# Patient Record
Sex: FEMALE | Race: BLACK OR AFRICAN AMERICAN | Employment: UNEMPLOYED | URBAN - METROPOLITAN AREA
[De-identification: names, ages, dates, MRNs, and addresses within clinical notes are randomized per-mention and may not be internally consistent; named-entity substitution may affect disease eponyms.]

---

## 2021-12-28 ENCOUNTER — HOSPITAL ENCOUNTER (OUTPATIENT)
Dept: PHYSICAL THERAPY | Age: 66
Discharge: HOME | End: 2021-12-28
Attending: ORTHOPAEDIC SURGERY
Payer: OTHER GOVERNMENT

## 2021-12-28 DIAGNOSIS — M94.261 CHONDROMALACIA, KNEE, RIGHT: ICD-10-CM

## 2021-12-28 DIAGNOSIS — G89.29 CHRONIC RIGHT-SIDED LOW BACK PAIN, UNSPECIFIED WHETHER SCIATICA PRESENT: ICD-10-CM

## 2021-12-28 DIAGNOSIS — M25.561 ACUTE PAIN OF RIGHT KNEE: ICD-10-CM

## 2021-12-28 DIAGNOSIS — M54.50 CHRONIC RIGHT-SIDED LOW BACK PAIN, UNSPECIFIED WHETHER SCIATICA PRESENT: ICD-10-CM

## 2021-12-28 DIAGNOSIS — M21.70 ACQUIRED LEG LENGTH DISCREPANCY: ICD-10-CM

## 2021-12-28 PROCEDURE — 97110 THERAPEUTIC EXERCISES: CPT

## 2021-12-28 PROCEDURE — 97162 PT EVAL MOD COMPLEX 30 MIN: CPT

## 2021-12-28 NOTE — PROGRESS NOTES
Raquel Villegas  : 1955  Primary: [facility]  Secondary:  [address] at [place] [id number] [facility], [address], [place] [address].  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 TREATMENT PLAN:  Effective Dates: 2021 TO 3/22/2022 (90 days). Frequency/Duration: 2 times a week for 90 Day(s)    Pre-treatment Symptoms/Complaints:  LBP, R Knee Pain  Pain: Initial:   9-10/10 Post Session:  9-10/10   Medications Last Reviewed:  2021  Updated Objective Findings:  See evaluation note from today  TREATMENT:     THERAPEUTIC EXERCISE: (10 minutes):  Exercises per grid below to improve mobility and strength. Required minimal verbal cues to promote proper body alignment and promote proper body posture. Progressed resistance and repetitions as indicated. Date:  21 Date:   Date:     Activity/Exercise Parameters Parameters Parameters   Quad Sets 15 reps  5 sec holds  (HEP)     Heel Slides 15 reps  5 sec holds  (HEP)     Supine Lumbar Rotation Stretch 10 reps  5 sec holds  Bilaterally  (HEP)     SKTC 3 reps  20 sec holds  (HEP)                           MedBridge Portal  Treatment/Session Summary:    · Response to Treatment:  Pt tolerated treatments well today with no c/o. · Communication/Consultation:  None today  · Equipment provided today:  None today  · Recommendations/Intent for next treatment session: Next visit will focus on manual therapy, progress therex as tolerable.     Total Treatment Billable Duration:  10 minutes  PT Patient Time In/Time Out  Time In: 0845  Time Out: Louis Novak, PT   Visit # Therapist initials Date Arrived NS/ Cx < 24 hr >24 hr Cx Visit Comments   1  12-28-21 X   15 Visit Max                                                                                                                                                              Abbreviations: NS = No Show; CX = cancelled      Future Appointments   Date Time Provider Radha Zeenat   1/3/2022  3:15 PM Lylia Nanas, PTA [facility]   1/5/2022  3:15 PM Jason Cantrell, PTA [facility]   1/10/2022  3:15 PM Mustafa Litter, PT [facility]   1/12/2022  9:30 AM Mustafa Litter, PT [facility]   1/17/2022  1:45 PM Lylia Nanas, PTA [facility]   1/19/2022  1:00 PM Lylia Nanas, PTA [facility]   1/20/2022  9:30 AM Norm Hand MD [facility]   1/24/2022  8:45 AM Lylia Nanas, PTA [facility]   1/27/2022  8:45 AM Mustafa Litter, PT [facility]   1/31/2022  8:45 AM Jason Eugene, PTA [facility]   2/10/2022  9:00 AM Norm Hand MD [facility]
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
## 2022-01-05 ENCOUNTER — HOSPITAL ENCOUNTER (OUTPATIENT)
Dept: PHYSICAL THERAPY | Age: 67
Discharge: HOME | End: 2022-01-05
Attending: ORTHOPAEDIC SURGERY
Payer: OTHER GOVERNMENT

## 2022-01-05 PROCEDURE — 97110 THERAPEUTIC EXERCISES: CPT

## 2022-01-05 NOTE — PROGRESS NOTES
Jake Jobs  : 1955  Primary: [facility]  Secondary:  [address] at [facility]  [address] [facility], [address], [place] [address].  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 TREATMENT PLAN:  Effective Dates: 2021 TO 3/22/2022 (90 days). Frequency/Duration: 2 times a week for 90 Day(s)    Pre-treatment Symptoms/Complaints:  LBP, R Knee Pain  \"No new change in symptoms at this time. Pain: Initial:   5-/10 Post Session:  510   Medications Last Reviewed:  2022  Updated Objective Findings:  None Today  TREATMENT:     THERAPEUTIC EXERCISE: (40 minutes):  Exercises per grid below to improve mobility and strength. Required minimal verbal cues to promote proper body alignment and promote proper body posture. Progressed resistance and repetitions as indicated. Date:  22 Date:   Date:     Activity/Exercise Parameters Parameters Parameters   Quad Sets 15 reps  5 sec holds  (HEP)     Heel Slides 15 reps  5 sec holds  (HEP)     Supine Lumbar Rotation Stretch 10 reps  5 sec holds  Bilaterally  (HEP)     SKTC 3 reps  20 sec holds  (HEP)     Nu-Step X 6 minutes     Piriformis stretch 3 x 20 sec holds bilaterally     LAQ-SAQ 1# x 20 reps. Hip Abduction T-band Green x 20 reps     Pelvic tilt X 15 reps     Standing LE exercises x     Bridging X 10 reps         MedBridge Portal  Treatment/Session Summary:    · Response to Treatment:  Pt tolerated treatments well today with no c/o.   Added a few more stretches for HEP  · Communication/Consultation:  None today  · Equipment provided today:  None today  · Recommendations/Intent for next treatment session: Next visit will focus on manual therapy, progress therex as tolerable.     Total Treatment Billable Duration:  40 minutes  PT Patient Time In/Time Out  Time In: 2329  Time Out: [address], [facility]   Visit # Therapist initials Date Arrived NS/ Cx < 24 hr >24 hr Cx Visit Comments   1 BW 12-28-21 X   15 Visit Max   2 rjk 1-5-22 x                                                                                                                                                        Abbreviations: NS = No Show; CX = cancelled      Future Appointments   Date Time Provider Radha Epperson   1/10/2022  3:15 PM Luis Bending, PT [facility]   1/12/2022  9:30 AM Luis Bending, PT [facility]   1/17/2022  1:45 PM Aptria Moll, PTA [facility]   1/19/2022  1:00 PM Patria Moll, PTA [facility]   1/20/2022  9:30 AM Joan Banuelos MD [facility]   1/24/2022  8:45 AM Patria Moll, PTA [facility]   1/27/2022  8:45 AM Luis Bending, PT [facility]   1/31/2022  8:45 AM Emely Tidwell, PTA [facility]   2/10/2022  9:00 AM Joan Banuelos MD [facility]

## 2022-01-10 ENCOUNTER — HOSPITAL ENCOUNTER (OUTPATIENT)
Dept: PHYSICAL THERAPY | Age: 67
Discharge: HOME | End: 2022-01-10
Attending: ORTHOPAEDIC SURGERY
Payer: OTHER GOVERNMENT

## 2022-01-10 PROCEDURE — 97110 THERAPEUTIC EXERCISES: CPT

## 2022-01-10 NOTE — PROGRESS NOTES
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 TREATMENT PLAN:  Effective Dates: 2021 TO 3/22/2022 (90 days). Frequency/Duration: 2 times a week for 90 Day(s)    Pre-treatment Symptoms/Complaints:  LBP, R Knee Pain;  Pt states she is having pain in her low back and R knee today. Pain: Initial:   5/10 Post Session:  510   Medications Last Reviewed:  1/10/2022  Updated Objective Findings:  None Today  TREATMENT:     THERAPEUTIC EXERCISE: (40 minutes):  Exercises per grid below to improve mobility and strength. Required minimal verbal cues to promote proper body alignment and promote proper body posture. Progressed resistance and repetitions as indicated. Date:  22 Date:  01-10-22 Date:     Activity/Exercise Parameters Parameters Parameters   Quad Sets 15 reps  5 sec holds  (HEP) 20 reps  5 sec holds    Heel Slides 15 reps  5 sec holds  (HEP) 20 reps  5 sec holds    Supine Lumbar Rotation Stretch 10 reps  5 sec holds  Bilaterally  (HEP) 10 reps  5 sec holds  Bilaterally    SKTC 3 reps  20 sec holds  (HEP) 3 reps  20 sec holds    Nu-Step X 6 minutes Level 3  6 minutes    Piriformis stretch 3 x 20 sec holds bilaterally 3 reps  20 sec holds    LAQ-SAQ 1# x 20 reps.  20 reps each    Hip Abduction T-band Green x 20 reps Green T-band  20 reps    Pelvic tilt X 15 reps 15 reps  5 sec holds    Standing LE exercises x     Bridging X 10 reps 10 reps  5 sec holds        MedBridge Portal  Treatment/Session Summary:    · Response to Treatment:  Pt tolerated treatments well today with no c/o increased pain. · Communication/Consultation:  None today  · Equipment provided today:  None today  · Recommendations/Intent for next treatment session: Next visit will focus on manual therapy, progress therex as tolerable.     Total Treatment Billable Duration:  40 minutes  PT Patient Time In/Time Out  Time In: 0531  Time Out: [address] Griffin Buerger, PT   Visit # Therapist initials Date Arrived NS/ Cx < 24 hr >24 hr Cx Visit Comments   1  12-28-21 X   15 Visit Max   2 rjk 1-5-22 x      3  01-10-22 X                                                                                                                                               Abbreviations: NS = No Show; CX = cancelled      Future Appointments   Date Time Provider Radha Epperson   1/12/2022  9:30 AM Didier Mayers, PT [facility]   1/17/2022  1:45 PM Kait Negron, PTA [facility]   1/19/2022  1:00 PM Kait Negron, PTA [facility]   1/20/2022  9:30 AM Yumiko Rodriguez MD [facility]   1/24/2022  2:30 PM Kait Negron PTA [facility]   1/27/2022  4:00 PM Didier Mayers, PT [facility]   1/31/2022  2:30 PM [facility], Nataliya Evans, PTA [facility]   2/10/2022  9:00 AM Yumiko Rodriguez MD [facility]

## 2022-01-12 ENCOUNTER — HOSPITAL ENCOUNTER (OUTPATIENT)
Dept: PHYSICAL THERAPY | Age: 67
Discharge: HOME | End: 2022-01-12
Attending: ORTHOPAEDIC SURGERY
Payer: OTHER GOVERNMENT

## 2022-01-12 PROCEDURE — 97110 THERAPEUTIC EXERCISES: CPT

## 2022-01-12 NOTE — PROGRESS NOTES
Sylvester Garcia  : 1955  Primary: [facility]  Secondary:  Therapy Center at [place] [address] [facility], [address] [place] [address] [address], [place] [address].  [phone]   [phone]         OUTPATIENT PHYSICAL THERAPY: Daily Treatment Note 2022  Visit Count:  4    ICD-10: Treatment Diagnosis: Pain in right knee (M25.561); Chondromalacia, right knee (M94.261); Low back pain (M54.5)  Precautions/Allergies:   Patient has no known allergies. TREATMENT PLAN:  Effective Dates: 2021 TO 3/22/2022 (90 days). Frequency/Duration: 2 times a week for 90 Day(s)    Pre-treatment Symptoms/Complaints:  LBP, R Knee Pain;  Pt states she is wearing an abdominal binder today which makes her back feel good. Pain: Initial:   5/10 Post Session:  510   Medications Last Reviewed:  2022  Updated Objective Findings:  None Today  TREATMENT:     THERAPEUTIC EXERCISE: (30 minutes):  Exercises per grid below to improve mobility and strength. Required minimal verbal cues to promote proper body alignment and promote proper body posture. Progressed resistance and repetitions as indicated. Date:  22 Date:  01-10-22 Date:  22   Activity/Exercise Parameters Parameters Parameters   Quad Sets 15 reps  5 sec holds  (HEP) 20 reps  5 sec holds    Heel Slides 15 reps  5 sec holds  (HEP) 20 reps  5 sec holds    Supine Lumbar Rotation Stretch 10 reps  5 sec holds  Bilaterally  (HEP) 10 reps  5 sec holds  Bilaterally 10 reps  5 sec holds  Bilaterally   SKTC 3 reps  20 sec holds  (HEP) 3 reps  20 sec holds 3 reps  20 sec holds   Nu-Step X 6 minutes Level 3  6 minutes Level 4  6 minutes   Piriformis stretch 3 x 20 sec holds bilaterally 3 reps  20 sec holds 3 reps  20 sec holds   LAQ-SAQ 1# x 20 reps.  20 reps each    Hip Abduction T-band Green x 20 reps Green T-band  20 reps    Pelvic tilt X 15 reps 15 reps  5 sec holds 20 reps  5 sec holds   Standing LE exercises x     Bridging X 10 reps 10 reps  5 sec holds 10 reps  5 sec holds   Active Hamstring Stretch   3 reps  20 sec holds   SLR Flexion   10 reps  3 sec holds  B LEONARD's       MedBridge Portal  Treatment/Session Summary:    · Response to Treatment:  Pt tolerated treatments well today with no c/o increased pain. Pt was 10 minutes late for her appointment today. She did well with new exercises. · Communication/Consultation:  None today  · Equipment provided today:  None today  · Recommendations/Intent for next treatment session: Next visit will focus on manual therapy, progress therex as tolerable.     Total Treatment Billable Duration:  40 minutes  PT Patient Time In/Time Out  Time In: 9392  Time Out: [address] [facility], PT   Visit # Therapist initials Date Arrived NS/ Cx < 24 hr >24 hr Cx Visit Comments   1  12-28-21 X   15 Visit Max   2 rjk 1-5-22 x      3  01-10-22 X      4  01-12-22 X                                                                                                                                      Abbreviations: NS = No Show; CX = cancelled      Future Appointments   Date Time Provider Radha Epperson   1/17/2022  1:45 PM Kait Negron PTA [facility]   1/19/2022  1:00 PM Kait Negron, PTA [facility]   1/20/2022  9:30 AM Yumiko Rodriguez MD [facility]   1/24/2022  2:30 PM Kait Negron PTA [facility]   1/27/2022  4:00 PM Didier Mayers, PT [facility]   1/31/2022  2:30 PM Nataliya Niño, PTA [facility]   2/10/2022  9:00 AM Yumiko Rodriguez MD [facility]

## 2022-01-17 ENCOUNTER — APPOINTMENT (OUTPATIENT)
Dept: PHYSICAL THERAPY | Age: 67
End: 2022-01-17
Attending: ORTHOPAEDIC SURGERY
Payer: OTHER GOVERNMENT

## 2022-01-24 ENCOUNTER — HOSPITAL ENCOUNTER (OUTPATIENT)
Dept: PHYSICAL THERAPY | Age: 67
Discharge: HOME | End: 2022-01-24
Attending: ORTHOPAEDIC SURGERY
Payer: OTHER GOVERNMENT

## 2022-01-24 PROCEDURE — 97110 THERAPEUTIC EXERCISES: CPT

## 2022-01-24 NOTE — PROGRESS NOTES
Efrem Ainsworth  : 1955  Primary: [facility]  [facility]:  [facility], [address], [facility] [address].  [phone]   [phone]         OUTPATIENT PHYSICAL THERAPY: Daily Treatment Note 2022  Visit Count:  5    ICD-10: Treatment Diagnosis: Pain in right knee (M25.561); Chondromalacia, right knee (M94.261); Low back pain (M54.5)  Precautions/Allergies:   Patient has no known allergies. TREATMENT PLAN:  Effective Dates: 2021 TO 3/22/2022 (90 days). Frequency/Duration: 2 times a week for 90 Day(s)    Pre-treatment Symptoms/Complaints:  LBP, R Knee Pain;  \"They upped my pills so I feel a little better\"  Pain: Initial:   5/10 Post Session:  510   Medications Last Reviewed:  2022  Updated Objective Findings:  None Today  TREATMENT:     THERAPEUTIC EXERCISE: (30 minutes):  Exercises per grid below to improve mobility and strength. Required minimal verbal cues to promote proper body alignment and promote proper body posture. Progressed resistance and repetitions as indicated. Date:  1-10-22 Date:  22 Date:  22   Activity/Exercise Parameters Parameters Parameters   Quad Sets 15 reps  5 sec holds  (HEP) 20 reps  5 sec holds    Heel Slides 15 reps  5 sec holds  (HEP) 20 reps  5 sec holds    Supine Lumbar Rotation Stretch 10 reps  5 sec holds  Bilaterally  (HEP) 10 reps  5 sec holds  Bilaterally 10 reps  5 sec holds  Bilaterally   SKTC 3 reps  20 sec holds  (HEP) 3 reps  20 sec holds 3 reps  20 sec holds   Nu-Step X 6 minutes Level 3  6 minutes Level 4  6 minutes   Piriformis stretch 3 x 20 sec holds bilaterally 3 reps  20 sec holds 3 reps  20 sec holds   LAQ-SAQ 1# x 20 reps.  20 reps each    Hip Abduction T-band Green x 20 reps Green T-band  20 reps Blue  20 reps   Pelvic tilt X 15 reps 15 reps  5 sec holds 20 reps  5 sec holds   Standing LE exercises x  x   Bridging X 10 reps 10 reps  5 sec holds 10 reps  5 sec holds   Active Hamstring Stretch   3 reps  20 sec holds   SLR Flexion   15 reps  3 sec holds  B LE's       [employer] Portal  Treatment/Session Summary:    · Response to Treatment:  Pt tolerated treatments well today with no c/o increased pain. · Communication/Consultation:  None today  · Equipment provided today:  None today  · Recommendations/Intent for next treatment session: Next visit will focus on manual therapy, progress therex as tolerable.     Total Treatment Billable Duration:  40 minutes  PT Patient Time In/Time Out  Time In: 1430  Time Out: Ansley MONTGOMERY 330, PTA   Visit # Therapist initials Date Arrived NS/ Cx < 24 hr >24 hr Cx Visit Comments   1  12-28-21 X   15 Visit Max   2 rjk 1-5-22 x      3  01-10-22 X      4  01-12-22 X      5 rjk 1-22-22 x      6 rjk 1-24-22                                                                                                                     Abbreviations: NS = No Show; CX = cancelled      Future Appointments   Date Time Provider Radha Epperson   1/24/2022  2:30 PM Ronda Jimenez PTA [facility]   1/27/2022  4:00 PM Bernardo Coburn PT [facility]   1/31/2022  2:30 PM [facility]   2/22/2022  1:45 PM Alberto Cooney MD [facility] [place]

## 2022-01-27 ENCOUNTER — HOSPITAL ENCOUNTER (OUTPATIENT)
Dept: PHYSICAL THERAPY | Age: 67
Discharge: HOME | End: 2022-01-27
Attending: ORTHOPAEDIC SURGERY
Payer: OTHER GOVERNMENT

## 2022-01-27 PROCEDURE — 97110 THERAPEUTIC EXERCISES: CPT

## 2022-01-27 NOTE — PROGRESS NOTES
Jake Jobs  : 1955  Primary: [facility]  Secondary:  [facility]rapy Center at [place] [id number] [facility], [address] [place] [address] [address], [address] [facility]  [phone]   [phone]         OUTPATIENT PHYSICAL THERAPY: Daily Treatment Note 2022  Visit Count:  6    ICD-10: Treatment Diagnosis: Pain in right knee (M25.561); Chondromalacia, right knee (M94.261); Low back pain (M54.5)  Precautions/Allergies:   Patient has no known allergies. TREATMENT PLAN:  Effective Dates: 2021 TO 3/22/2022 (90 days). Frequency/Duration: 2 times a week for 90 Day(s)    Pre-treatment Symptoms/Complaints:  LBP, R Knee Pain;   Pt states she did an exercise program that comes on TV this morning and she thinks she may have overstretched. She states she is also not wearing her abdominal binder today. She thinks that these 2 things have caused increased pain today. Pain: Initial:   8/10 Post Session:  510   Medications Last Reviewed:  2022  Updated Objective Findings:  None Today  TREATMENT:     THERAPEUTIC EXERCISE: (40 minutes):  Exercises per grid below to improve mobility and strength. Required minimal verbal cues to promote proper body alignment and promote proper body posture. Progressed resistance and repetitions as indicated.      Date:  1-10-22 Date:  22 Date:  22 Date:  22   Activity/Exercise Parameters Parameters Parameters    Quad Sets 15 reps  5 sec holds  (HEP) 20 reps  5 sec holds     Heel Slides 15 reps  5 sec holds  (HEP) 20 reps  5 sec holds     Supine Lumbar Rotation Stretch 10 reps  5 sec holds  Bilaterally  (HEP) 10 reps  5 sec holds  Bilaterally 10 reps  5 sec holds  Bilaterally    SKTC 3 reps  20 sec holds  (HEP) 3 reps  20 sec holds 3 reps  20 sec holds 3 reps  20 sec holds   Nu-Step X 6 minutes Level 3  6 minutes Level 4  6 minutes Level 4  6 minutes   Piriformis stretch 3 x 20 sec holds bilaterally 3 reps  20 sec holds 3 reps  20 sec holds 3 reps  20 sec holds   LAQ-SAQ 1# x 20 reps. 20 reps each     Hip Abduction T-band Green x 20 reps Green T-band  20 reps Blue  20 reps Blue T-band  20 reps   Pelvic tilt X 15 reps 15 reps  5 sec holds 20 reps  5 sec holds    Standing LE exercises x  x    Bridging X 10 reps 10 reps  5 sec holds 10 reps  5 sec holds 15 reps   Active Hamstring Stretch   3 reps  20 sec holds 3 reps  20 sec holds   SLR Flexion   15 reps  3 sec holds  B LE's    TKE with T-band     Green T-band  20 reps  R Knee   Gastroc Slantboard    3 reps  20 sec holds   Isometric Unilateral Hip Flexion    5 reps  10 sec holds       [employer] Portal  Treatment/Session Summary:    · Response to Treatment:  Pt tolerated treatments well today with no c/o increased pain. She did well with new exercises today. · Communication/Consultation:  None today  · Equipment provided today:  None today  · Recommendations/Intent for next treatment session: Next visit will focus on manual therapy, progress therex as tolerable.     Total Treatment Billable Duration:  40 minutes  PT Patient Time In/Time Out  Time In: 3745  Time Out: Jolly 12, PT   Visit # Therapist initials Date Arrived NS/ Cx < 24 hr >24 hr Cx Visit Comments   1  12-28-21 X   15 Visit Max   2 rjk 1-5-22 x      3 BW 01-10-22 X      4 BW 01-12-22 X      5 rjk 1-22-22 x      6 rjk 1-24-22       7 BW 01-27-22 X                                                                                                           Abbreviations: NS = No Show; CX = cancelled      Future Appointments   Date Time Provider Radha Epperson   1/31/2022  2:30 PM [facility]   2/22/2022  1:50 PM Herrera Ritchie MD [facility]

## 2022-01-31 ENCOUNTER — HOSPITAL ENCOUNTER (OUTPATIENT)
Dept: PHYSICAL THERAPY | Age: 67
Discharge: HOME | End: 2022-01-31
Attending: ORTHOPAEDIC SURGERY
Payer: OTHER GOVERNMENT

## 2022-01-31 PROCEDURE — 97110 THERAPEUTIC EXERCISES: CPT

## 2022-01-31 NOTE — PROGRESS NOTES
Katie Almeida  : 1955  Primary: [facility]  Secondary:  Therapy Center at [place] [address] [facility], [address] [place] [address],[address], [place] [address].  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 TREATMENT PLAN:  Effective Dates: 2021 TO 3/22/2022 (90 days). Frequency/Duration: 2 times a week for 90 Day(s)    Pre-treatment Symptoms/Complaints: \"It hurts    Pain: Initial:   7 /10 Post Session:  5/10   Medications Last Reviewed:  2022  Updated Objective Findings:  None Today  TREATMENT:     THERAPEUTIC EXERCISE: (40 minutes):  Exercises per grid below to improve mobility and strength. Required minimal verbal cues to promote proper body alignment and promote proper body posture. Progressed resistance and repetitions as indicated. Date:  1-10-22 Date:  22 Date:  22 Date:  22   Activity/Exercise Parameters Parameters Parameters    Quad Sets 15 reps  5 sec holds  (HEP) 20 reps  5 sec holds  x   Heel Slides 15 reps  5 sec holds  (HEP) 20 reps  5 sec holds  x   Supine Lumbar Rotation Stretch 10 reps  5 sec holds  Bilaterally  (HEP) 10 reps  5 sec holds  Bilaterally 10 reps  5 sec holds  Bilaterally x   SKTC 3 reps  20 sec holds  (HEP) 3 reps  20 sec holds 3 reps  20 sec holds 3 reps  20 sec holds   Nu-Step X 6 minutes Level 3  6 minutes Level 4  6 minutes Level 4  6 minutes   Piriformis stretch 3 x 20 sec holds bilaterally 3 reps  20 sec holds 3 reps  20 sec holds 3 reps  20 sec holds   LAQ-SAQ 1# x 20 reps.  20 reps each  x   Hip Abduction T-band Green x 20 reps Green T-band  20 reps Blue  20 reps Blue T-band  20 reps   Pelvic tilt X 15 reps 15 reps  5 sec holds 20 reps  5 sec holds X 20 reps   Standing LE exercises x  x x   Bridging X 10 reps 10 reps  5 sec holds 10 reps  5 sec holds 15 reps   Active Hamstring Stretch   3 reps  20 sec holds 3 reps  20 sec holds          SLR Flexion   15 reps  3 sec holds  B LE's x   TKE with T-band     blue T-band  20 reps  R Knee   Gastroc Slantboard    3 reps  20 sec holds   Isometric Unilateral Hip Flexion    5 reps  10 sec holds       [employer] Portal  Treatment/Session Summary:    · Response to Treatment:  Pt tolerated treatments well today with no c/o increased pain. Patient continues to have increased pain, less then last visit however, she continues to do her HEP. · Communication/Consultation:  None today  · Equipment provided today:  None today  · Recommendations/Intent for next treatment session: Next visit will focus on manual therapy, progress therex as tolerable.     Total Treatment Billable Duration:  40 minutes  PT Patient Time In/Time Out  Time In: 1430  Time Out: Ansley MONTGOMERY 330, PTA   Visit # Therapist initials Date Arrived NS/ Cx < 24 hr >24 hr Cx Visit Comments   1  12-28-21 X   15 Visit Max   2 rjk 1-5-22 x      3 BW 01-10-22 X      4 BW 01-12-22 X      5 rjk 1-22-22 x      6 rjk 1-24-22       7 BW 01-27-22 X      8 rjk 1-31-22 x                                                                                                  Abbreviations: NS = No Show; CX = cancelled      Future Appointments   Date Time Provider Radha Epperson   1/31/2022  2:30 PM Loren Preston PTA [facility]   2/3/2022  9:30 AM Milena Rogers, PT [facility]   2/7/2022  2:30 PM Milena Rogers, PT [facility]   2/10/2022  9:30 AM Milena Rogers, PT [facility]   2/14/2022  2:30 PM Milena Rogers, PT [facility]   2/17/2022  9:30 AM Milena Rogers, PT [facility]   2/21/2022  9:30 AM Milena Rogers, PT [facility]   2/22/2022  2:20 PM Mechelle Trinidad MD [facility]   2/24/2022  9:30 AM Milena Rogers, PT [facility]   2/28/2022  9:30 AM Milena Rogers, PT [facility] AYESHA

## 2022-02-03 ENCOUNTER — HOSPITAL ENCOUNTER (OUTPATIENT)
Dept: PHYSICAL THERAPY | Age: 67
Discharge: HOME | End: 2022-02-03
Attending: ORTHOPAEDIC SURGERY
Payer: OTHER GOVERNMENT

## 2022-02-03 PROCEDURE — 97110 THERAPEUTIC EXERCISES: CPT

## 2022-02-03 NOTE — PROGRESS NOTES
Frank Herrera  : 1955  Primary: [facility]  Secondary:  Therapy Center at [address] [place] [address], [address] [place] [address],[address], [place] [address].  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 TREATMENT PLAN:  Effective Dates: 2021 TO 3/22/2022 (90 days). Frequency/Duration: 2 times a week for 90 Day(s)    Pre-treatment Symptoms/Complaints: Pt rates her current knee pain 4/10, low back pain 8/10. Pt states she feels she is making progress with current treatments. Pain: Initial:   7 /10 Post Session:  5/10   Medications Last Reviewed:  2/3/2022  Updated Objective Findings:  None Today  TREATMENT:     THERAPEUTIC EXERCISE: (40 minutes):  Exercises per grid below to improve mobility and strength. Required minimal verbal cues to promote proper body alignment and promote proper body posture. Progressed resistance and repetitions as indicated.      Date:  22 Date:  22 Date:  22 Date:  22   Activity/Exercise Parameters Parameters     Quad Sets 20 reps  5 sec holds  x    Heel Slides 20 reps  5 sec holds  x    Supine Lumbar Rotation Stretch 10 reps  5 sec holds  Bilaterally 10 reps  5 sec holds  Bilaterally x    SKTC 3 reps  20 sec holds 3 reps  20 sec holds 3 reps  20 sec holds DKTC  3 reps  20 sec holds   Nu-Step Level 3  6 minutes Level 4  6 minutes Level 4  6 minutes Level 4  6 minutes   Piriformis stretch 3 reps  20 sec holds 3 reps  20 sec holds 3 reps  20 sec holds    LAQ-SAQ 20 reps each  x    Hip Abduction Green T-band  20 reps Blue  20 reps Blue T-band  20 reps Blue T-band  20 reps   Pelvic tilt 15 reps  5 sec holds 20 reps  5 sec holds X 20 reps    Standing LE exercises  x x    Bridging 10 reps  5 sec holds 10 reps  5 sec holds 15 reps 20 reps   Active Hamstring Stretch  3 reps  20 sec holds 3 reps  20 sec holds           SLR Flexion  15 reps  3 sec holds  B LE's x    TKE with T-band    blue T-band  20 reps  R Knee Blue T-band  20 reps  R Knee   Gastroc Slantboard   3 reps  20 sec holds 3 reps  20 sec holds   Isometric Unilateral Hip Flexion   5 reps  10 sec holds 5 reps  10 sec holds   Theraband Rows and Straight Arm Pulldowns    Green T-band  20 reps each   Supine Marching    20 reps  B LE's       [employer] Portal  Treatment/Session Summary:    · Response to Treatment:  Pt tolerated treatments well today with no c/o increased pain. Pt did well with new exercises today. · Communication/Consultation:  None today  · Equipment provided today:  None today  · Recommendations/Intent for next treatment session: Next visit will focus on manual therapy, progress therex as tolerable.     Total Treatment Billable Duration:  40 minutes  PT Patient Time In/Time Out  Time In: 4698  Time Out: Domingo PT   Visit # Therapist initials Date Arrived NS/ Cx < 24 hr >24 hr Cx Visit Comments   1  12-28-21 X   15 Visit Max   2 rjk 1-5-22 x      3 BW 01-10-22 X      4 BW 01-12-22 X      5 rjk 1-22-22 x      6 rjk 1-24-22       7 BW 01-27-22 X      8 rjk 1-31-22 x      9 BW 02-03-22 X                                                                                         Abbreviations: NS = No Show; CX = cancelled      Future Appointments   Date Time Provider Radha Epperson   2/7/2022  2:30 PM Carolina Loupe, PT [facility]   2/10/2022  9:30 AM Carolina Loupe, PT [facility]   2/14/2022  2:30 PM Carolina Loupe, PT [facility]   2/17/2022  9:30 AM Carolina Loupe, PT [facility]   2/21/2022  9:30 AM Carolina Loupe, PT [facility]   2/22/2022  2:20 PM Jomar Schofield MD [facility]   2/24/2022  9:30 AM Carolina Loupe, PT [facility]   2/28/2022  9:30 AM MKIE Pedroza
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
## 2022-02-07 ENCOUNTER — HOSPITAL ENCOUNTER (OUTPATIENT)
Dept: PHYSICAL THERAPY | Age: 67
Discharge: HOME | End: 2022-02-07
Attending: ORTHOPAEDIC SURGERY
Payer: OTHER GOVERNMENT

## 2022-02-07 PROCEDURE — 97110 THERAPEUTIC EXERCISES: CPT

## 2022-02-07 NOTE — PROGRESS NOTES
Katie Almeida  : 1955  Primary: [facility]  Secondary:  Therapy Center at [address] [place] [address], [address] [place] [address],[address], [place] [address].  [phone]   [phone]         OUTPATIENT PHYSICAL THERAPY: Daily Treatment Note 2022  Visit Count:  9    ICD-10: Treatment Diagnosis: Pain in right knee (M25.561); Chondromalacia, right knee (M94.261); Low back pain (M54.5)  Precautions/Allergies:   Patient has no known allergies. TREATMENT PLAN:  Effective Dates: 2021 TO 3/22/2022 (90 days). Frequency/Duration: 2 times a week for 90 Day(s)    Pre-treatment Symptoms/Complaints: Pt states her pain is better than last week. Pain: Initial:   10 Post Session:  5/10   Medications Last Reviewed:  2022  Updated Objective Findings:  None Today  TREATMENT:     THERAPEUTIC EXERCISE: (40 minutes):  Exercises per grid below to improve mobility and strength. Required minimal verbal cues to promote proper body alignment and promote proper body posture. Progressed resistance and repetitions as indicated.      Date:  22 Date:  22 Date:  22 Date:  22 Date:  22   Activity/Exercise Parameters Parameters      Quad Sets 20 reps  5 sec holds  x     Heel Slides 20 reps  5 sec holds  x     Supine Lumbar Rotation Stretch 10 reps  5 sec holds  Bilaterally 10 reps  5 sec holds  Bilaterally x     SKTC 3 reps  20 sec holds 3 reps  20 sec holds 3 reps  20 sec holds DKTC  3 reps  20 sec holds DKTC  3 reps  20 sec holds   Nu-Step Level 3  6 minutes Level 4  6 minutes Level 4  6 minutes Level 4  6 minutes Level 4  7 minutes   Piriformis stretch 3 reps  20 sec holds 3 reps  20 sec holds 3 reps  20 sec holds     LAQ-SAQ 20 reps each  x     Hip Abduction Green T-band  20 reps Blue  20 reps Blue T-band  20 reps Blue T-band  20 reps Blue T-band  20 reps   Pelvic tilt 15 reps  5 sec holds 20 reps  5 sec holds X 20 reps     Standing LE exercises  x x     Bridging 10 reps  5 sec holds 10 reps  5 sec holds 15 reps 20 reps 20 reps   Active Hamstring Stretch  3 reps  20 sec holds 3 reps  20 sec holds             SLR Flexion  15 reps  3 sec holds  B LE's x     TKE with T-band    blue T-band  20 reps  R Knee Blue T-band  20 reps  R Knee Blue T-band  20 reps  R Knee   Gastroc Slantboard   3 reps  20 sec holds 3 reps  20 sec holds 3 reps  20 sec holds   Isometric Unilateral Hip Flexion   5 reps  10 sec holds 5 reps  10 sec holds 5 reps  10 sec holds   Theraband Rows and Straight Arm Pulldowns    Green T-band  20 reps each Blue T-band  20 reps each   Supine Marching    20 reps  B LE's 20 reps  B LE's       Med[employer] Portal  Treatment/Session Summary:    · Response to Treatment:  Pt tolerated treatments well today with no c/o increased pain. Decreased pain noted today in low back. · Communication/Consultation:  None today  · Equipment provided today:  None today  · Recommendations/Intent for next treatment session: Next visit will focus on manual therapy, progress therex as tolerable.     Total Treatment Billable Duration:  40 minutes  PT Patient Time In/Time Out  Time In: 0930  Time Out: [address]   Visit # Therapist initials Date Arrived NS/ Cx < 24 hr >24 hr Cx Visit Comments   1  12-28-21 X   15 Visit Max   2 rjk 1-5-22 x      3  01-10-22 X      4  01-12-22 X      5 rjk 1-22-22 x      6 rjk 1-24-22       7 BW 01-27-22 X      8 rjk 1-31-22 x      9  02-03-22 X      10  02-07-22 X   6 approved visits remaining for 2022                                                                             Abbreviations: NS = No Show; CX = cancelled      Future Appointments   Date Time Provider Radha Epperson   2/10/2022  9:30 AM Montrell Vargas, PT [facility] SFE   2/14/2022  2:30 PM Montrell Vargas, PT [facility] SFE   2/17/2022  9:30 AM Montrell Vargas, PT [facility]   2/21/2022  9:30 AM Montrell Vargas, PT [facility] E   2/22/2022  2:20 PM Mary Garcia MD [facility]   2/24/2022  9:30 AM Kaylee Cristobal, PT [facility]   2/28/2022  9:30 AM Kaylee Cristobal, PT [facility]

## 2022-02-10 ENCOUNTER — HOSPITAL ENCOUNTER (OUTPATIENT)
Dept: PHYSICAL THERAPY | Age: 67
Discharge: HOME | End: 2022-02-10
Attending: ORTHOPAEDIC SURGERY
Payer: OTHER GOVERNMENT

## 2022-02-10 NOTE — PROGRESS NOTES
PT Note:  Pt cancelled her physical therapy appt for today (greater than 24 hrs in advance) due to her son having an appt.     Jad Gross, PT

## 2022-02-14 ENCOUNTER — HOSPITAL ENCOUNTER (OUTPATIENT)
Dept: PHYSICAL THERAPY | Age: 67
Discharge: HOME | End: 2022-02-14
Attending: ORTHOPAEDIC SURGERY
Payer: OTHER GOVERNMENT

## 2022-02-14 PROCEDURE — 97110 THERAPEUTIC EXERCISES: CPT

## 2022-02-14 NOTE — PROGRESS NOTES
Leona Batista  : 1955  Primary: [facility]  Secondary:  Therapy Center at [place] [address] [facility], [address] [place] [address],[address], [place] [address].  [phone]   [phone]         OUTPATIENT PHYSICAL THERAPY: Daily Treatment Note 2022  Visit Count:  10    ICD-10: Treatment Diagnosis: Pain in right knee (M25.561); Chondromalacia, right knee (M94.261); Low back pain (M54.5)  Precautions/Allergies:   Patient has no known allergies. TREATMENT PLAN:  Effective Dates: 2021 TO 3/22/2022 (90 days). Frequency/Duration: 2 times a week for 90 Day(s)    Pre-treatment Symptoms/Complaints: no new complaints at this time \"I take the one pill and it works\"  Pain: Initial:   3 /10 Post Session: 3/10   Medications Last Reviewed:  2022  Updated Objective Findings:  None Today  TREATMENT:     THERAPEUTIC EXERCISE: (40 minutes):  Exercises per grid below to improve mobility and strength. Required minimal verbal cues to promote proper body alignment and promote proper body posture. Progressed resistance and repetitions as indicated.      Date:  22 Date:  22   Activity/Exercise     Quad Sets  x   Heel Slides  x   Supine Lumbar Rotation Stretch  x   SKTC DKTC  3 reps  20 sec holds DKTC  3 reps  20 sec holds   Nu-Step Level 4  6 minutes Level 4  7 minutes   Piriformis stretch  x   LAQ-SAQ  x   Hip Abduction Blue T-band  20 reps Blue T-band  20 reps   Pelvic tilt  x   Standing LE exercises  x   Bridging 20 reps 20 reps   Active Hamstring Stretch  30 pumps      x   SLR Flexion     TKE with T-band  Blue T-band  20 reps  R Knee Blue T-band  20 reps  R Knee   Gastroc Slantboard 3 reps  20 sec holds 3 reps  20 sec holds   Isometric Unilateral Hip Flexion 5 reps  10 sec holds 5 reps  10 sec holds   Theraband Rows and Straight Arm Pulldowns Green T-band  20 reps each Blue T-band  20 reps each   Supine Marching 20 reps  B LE's 20 reps  B LE's       MedBridge Portal  Treatment/Session Summary:    · Response to Treatment:  Pt tolerated treatments well today with no c/o increased pain. Decreased pain noted today in low back and knee overall. · Communication/Consultation:  None today  · Equipment provided today:  None today  · Recommendations/Intent for next treatment session: Next visit will focus on manual therapy, progress therex as tolerable.     Total Treatment Billable Duration:  40 minutes  PT Patient Time In/Time Out  Time In: 1430  Time Out: [address], PTA   Visit # Therapist initials Date Arrived NS/ Cx < 24 hr >24 hr Cx Visit Comments   1 BW 12-28-21 X   15 Visit Max   2 rjk 1-5-22 x      3 BW 01-10-22 X      4 BW 01-12-22 X      5 rjk 1-22-22 x      6 rjk 1-24-22       7 BW 01-27-22 X      8 rjk 1-31-22 x      9 BW 02-03-22 X      10 BW 02-07-22 X   6 approved visits remaining for 2022   11 rjk 2-14-22 x   5/6 approved                                                                    Abbreviations: NS = No Show; CX = cancelled      Future Appointments   Date Time Provider Radha Epperson   2/14/2022  2:30 PM [facility]   2/17/2022  9:30 AM Didier Mayers, PT [facility] SFE   2/21/2022  9:30 AM Didier Mayers, PT [facility] SFE   2/22/2022  2:20 PM Yumiko Rodriguez MD [facility]   2/24/2022  9:30 AM Didier Mayers, PT [facility] SFE   2/28/2022  9:30 AM Didier Mayers, PT [facility] SFE   5/11/2022  1:00 PM Moni Abbott MD [facility]

## 2022-02-21 ENCOUNTER — HOSPITAL ENCOUNTER (OUTPATIENT)
Dept: PHYSICAL THERAPY | Age: 67
Discharge: HOME | End: 2022-02-21
Attending: ORTHOPAEDIC SURGERY
Payer: OTHER GOVERNMENT

## 2022-02-21 PROCEDURE — 97110 THERAPEUTIC EXERCISES: CPT

## 2022-02-21 NOTE — PROGRESS NOTES
Peter Dyer  : 1955  Primary: [facility]  Secondary:  Therapy Center at [place] [address] [facility], [address] [place] [address],[address], [place] [address].  [phone]   [phone]         OUTPATIENT PHYSICAL THERAPY: Daily Treatment Note 2022  Visit Count:  11    ICD-10: Treatment Diagnosis: Pain in right knee (M25.561); Chondromalacia, right knee (M94.261); Low back pain (M54.5)  Precautions/Allergies:   Patient has no known allergies. TREATMENT PLAN:  Effective Dates: 2021 TO 3/22/2022 (90 days). Frequency/Duration: 2 times a week for 90 Day(s)    Pre-treatment Symptoms/Complaints: Pt states she is having no pain today. Pain: Initial:   0/10 Post Session: 0/10   Medications Last Reviewed:  2022  Updated Objective Findings:  None Today  TREATMENT:     THERAPEUTIC EXERCISE: (40 minutes):  Exercises per grid below to improve mobility and strength. Required minimal verbal cues to promote proper body alignment and promote proper body posture. Progressed resistance and repetitions as indicated.      Date:  22 Date:  22 Date:  22   Activity/Exercise      Quad Sets  x    Heel Slides  x    Supine Lumbar Rotation Stretch  x    SKTC DKTC  3 reps  20 sec holds DKTC  3 reps  20 sec holds DKTC  3 reps  20 sec holds   Nu-Step Level 4  6 minutes Level 4  7 minutes    Piriformis stretch  x    LAQ-SAQ  x    Hip Abduction Blue T-band  20 reps Blue T-band  20 reps Blue T-band  20 reps   Pelvic tilt  x    Standing LE exercises  x    Bridging 20 reps 20 reps 20 reps   Active Hamstring Stretch  30 pumps  3 reps  20 sec holds     x    SLR Flexion      TKE with T-band  Blue T-band  20 reps  R Knee Blue T-band  20 reps  R Knee Blue T-band  20 reps  R Knee   Gastroc Slantboard 3 reps  20 sec holds 3 reps  20 sec holds 3 reps  30 sec holds   Isometric Unilateral Hip Flexion 5 reps  10 sec holds 5 reps  10 sec holds 5 reps  10 sec holds   Theraband Rows and Straight Arm Pulldowns Green T-band  20 reps each [employer] T-band  20 reps each Blue T-band  20 reps each   Supine Marching 20 reps  B LE's 20 reps  B LE's 20 reps  B LE's   Airdyne   10 minutes       MedBridge Portal  Treatment/Session Summary:    · Response to Treatment:  Pt tolerated treatments well today with no c/o increased pain. Pt doing better today with no c/o pain. · Communication/Consultation:  None today  · Equipment provided today:  None today  · Recommendations/Intent for next treatment session: Next visit will focus on manual therapy, progress therex as tolerable.     Total Treatment Billable Duration:  40 minutes  PT Patient Time In/Time Out  Time In: 0930  Time [facility]: [facility]   Visit # Therapist initials Date Arrived NS/ Cx < 24 hr >24 hr Cx Visit Comments   1  12-28-21 X   15 Visit Max   2 rjk 1-5-22 x      3 BW 01-10-22 X      4 BW 01-12-22 X      5 rjk 1-22-22 x      6 rjk 1-24-22       7 BW 01-27-22 X      8 rjk 1-31-22 x      9 BW 02-03-22 X      10 BW 02-07-22 X   6 approved visits remaining for 2022   11 rjk 2-14-22 x   5/6 approved   12 BW 02-21-22 X                                                              Abbreviations: NS = No Show; CX = cancelled      Future Appointments   Date Time Provider Radha Epperson   2/22/2022  2:20 PM MD Jonathon [facility]   2/24/2022  9:30 AM Silvestre Oates, PT [facility] SFE   2/28/2022  9:30 AM Silvestre Oates, PT [facility]   5/11/2022  1:00 PM Deep Brantley MD [facility]

## 2022-02-22 PROBLEM — K59.00 CONSTIPATION: Status: ACTIVE | Noted: 2021-02-01

## 2022-02-24 ENCOUNTER — HOSPITAL ENCOUNTER (OUTPATIENT)
Dept: PHYSICAL THERAPY | Age: 67
Discharge: HOME | End: 2022-02-24
Attending: ORTHOPAEDIC SURGERY
Payer: OTHER GOVERNMENT

## 2022-02-24 PROCEDURE — 97110 THERAPEUTIC EXERCISES: CPT

## 2022-02-24 NOTE — PROGRESS NOTES
Joseph Sharma  : 1955  [facility]: [facility]  Secondary:  Therapy Center at [address] [place] [address], [address] [place] [address],[address] [facility], [place] [address].  [phone]   [phone]         OUTPATIENT PHYSICAL THERAPY: Daily Treatment Note 2022  Visit Count:  12    ICD-10: Treatment Diagnosis: Pain in right knee (M25.561); Chondromalacia, right knee (M94.261); Low back pain (M54.5)  Precautions/Allergies:   Patient has no known allergies. TREATMENT PLAN:  Effective Dates: 2021 TO 3/22/2022 (90 days). Frequency/Duration: 2 times a week for 90 Day(s)    Pre-treatment Symptoms/Complaints: Pt states she is having a little pain in her low back, but she states she hasn't had any pain meds in 2 days. Pain: Initial:   5/10 Post Session: 5/10   Medications Last Reviewed:  2022  Updated Objective Findings:  None Today  TREATMENT:     THERAPEUTIC EXERCISE: (40 minutes):  Exercises per grid below to improve mobility and strength. Required minimal verbal cues to promote proper body alignment and promote proper body posture. Progressed resistance and repetitions as indicated.      Date:  22 Date:  22 Date:  22 Date:  22   Activity/Exercise       Quad Sets  x     Heel Slides  x     Supine Lumbar Rotation Stretch  x     SKTC DKTC  3 reps  20 sec holds DKTC  3 reps  20 sec holds DKTC  3 reps  20 sec holds DKTC  3 reps  20 sec holds   Nu-Step Level 4  6 minutes Level 4  7 minutes     Piriformis stretch  x     LAQ-SAQ  x     Hip Abduction Blue T-band  20 reps Blue T-band  20 reps Blue T-band  20 reps Blue T-band  20 reps   Pelvic tilt  x     Standing LE exercises  x     Bridging 20 reps 20 reps 20 reps 20 reps   Active Hamstring Stretch  30 pumps  3 reps  20 sec holds 3 reps  20 sec holds     x     SLR Flexion       TKE with T-band  Blue T-band  20 reps  R Knee Blue T-band  20 reps  R Knee Blue T-band  20 reps  R Knee Black T-band  20 reps   Gastroc Slantboard 3 reps  20 sec holds 3 reps  20 sec holds 3 reps  30 sec holds 3 reps  30 sec holds   Isometric Unilateral Hip Flexion 5 reps  10 sec holds 5 reps  10 sec holds 5 reps  10 sec holds 5 reps  10 sec holds   Theraband Rows and Straight Arm Pulldowns Green T-band  20 reps each Blue T-band  20 reps each Blue T-band  20 reps each Blue T-band  20 reps each   Supine Marching 20 reps  B LE's 20 reps  B LE's 20 reps  B LE's 20 reps  B LE's   Airdyne   10 minutes 10 minutes       [employer] Portal  Treatment/Session Summary:    · Response to Treatment:  Pt tolerated treatments well today with no c/o increased pain. Pt has 2 visits remaining. · Communication/Consultation:  None today  · Equipment provided today:  None today  · Recommendations/Intent for next treatment session: Next visit will focus on manual therapy, progress therex as tolerable.     Total Treatment Billable Duration:  40 minutes  PT Patient Time In/Time Out  Time In: 0930  Time Out: [address], PT   Visit # Therapist initials Date Arrived NS/ Cx < 24 hr >24 hr Cx Visit Comments   1 BW 12-28-21 X   15 Visit Max   2 rjk 1-5-22 x      3 BW 01-10-22 X      4 BW 01-12-22 X      5 rjk 1-22-22 x      6 rjk 1-24-22       7 BW 01-27-22 X      8 rjk 1-31-22 x      9 BW 02-03-22 X      10 BW 02-07-22 X   6 approved visits remaining for 2022   11 rjk 2-14-22 x   5/6 approved   12 BW 02-21-22 X      13 BW 02-24-22 X   13/15 visits                                                  Abbreviations: NS = No Show; CX = cancelled      Future Appointments   Date Time Provider Radha Zeenat   2/28/2022  9:30 AM Jacquelyn Walters PT [facility]   5/11/2022  1:00 PM Betty Dsouza MD [facility]   5/24/2022  8:40 AM Hao Grissom MD [facility]

## 2022-02-28 ENCOUNTER — HOSPITAL ENCOUNTER (OUTPATIENT)
Dept: PHYSICAL THERAPY | Age: 67
Discharge: HOME | End: 2022-02-28
Attending: ORTHOPAEDIC SURGERY
Payer: OTHER GOVERNMENT

## 2022-02-28 PROCEDURE — 97110 THERAPEUTIC EXERCISES: CPT

## 2022-02-28 NOTE — PROGRESS NOTES
Jake Jobs  : 1955  Primary: [facility]  Secondary:  Therapy Center at [place] [address] [facility], [address] [place] [address] [address], [address] [facility]  [phone]   [phone]         OUTPATIENT PHYSICAL THERAPY: Daily Treatment Note 2022  Visit Count:  13    ICD-10: Treatment Diagnosis: Pain in right knee (M25.561); Chondromalacia, right knee (M94.261); Low back pain (M54.5)  Precautions/Allergies:   Patient has no known allergies. TREATMENT PLAN:  Effective Dates: 2021 TO 3/22/2022 (90 days). Frequency/Duration: 2 times a week for 90 Day(s)    Pre-treatment Symptoms/Complaints: Pt states her pain is pretty good today, 5/10. Pain: Initial:   10 Post Session: 5/10   Medications Last Reviewed:  2022  Updated Objective Findings:  None Today  TREATMENT:     THERAPEUTIC EXERCISE: (40 minutes):  Exercises per grid below to improve mobility and strength. Required minimal verbal cues to promote proper body alignment and promote proper body posture. Progressed resistance and repetitions as indicated.      Date:  22 Date:  22 Date:  22 Date:  22 Date:  22   Activity/Exercise        Quad Sets  x      Heel Slides  x      Supine Lumbar Rotation Stretch  x      SKTC DKTC  3 reps  20 sec holds DKTC  3 reps  20 sec holds DKTC  3 reps  20 sec holds DKTC  3 reps  20 sec holds DKTC  3 reps  20 sec holds   Nu-Step Level 4  6 minutes Level 4  7 minutes      Piriformis stretch  x      LAQ-SAQ  x      Hip Abduction Blue T-band  20 reps Blue T-band  20 reps Blue T-band  20 reps Blue T-band  20 reps Blue T-band  20 reps   Pelvic tilt  x      Standing LE exercises  x      Bridging 20 reps 20 reps 20 reps 20 reps 20 reps   Active Hamstring Stretch  30 pumps  3 reps  20 sec holds 3 reps  20 sec holds 3 reps  20 sec holds     x      SLR Flexion        TKE with T-band  Blue T-band  20 reps  R Knee Blue T-band  20 reps  R Knee Blue T-band  20 reps  R Knee Black T-band  20 reps Black T-band  20 reps   Gastroc Slantboard 3 reps  20 sec holds 3 reps  20 sec holds 3 reps  30 sec holds 3 reps  30 sec holds 3 reps  30 sec holds   Isometric Unilateral Hip Flexion 5 reps  10 sec holds 5 reps  10 sec holds 5 reps  10 sec holds 5 reps  10 sec holds 5 reps  10 sec holds   Theraband Rows and Straight Arm Pulldowns Green T-band  20 reps each Blue T-band  20 reps each Blue T-band  20 reps each Blue T-band  20 reps each Blue T-band  20 reps each   Supine Marching 20 reps  B LE's 20 reps  B LE's 20 reps  B LE's 20 reps  B LE's 20 reps  B LE's   Airdyne   10 minutes 10 minutes 10 minutes       [employer] Portal  Treatment/Session Summary:    · Response to Treatment:  Pt tolerated treatments well today with no c/o increased pain. Pt has 1 visit remaining. · Communication/Consultation:  None today  · Equipment provided today:  None today  · Recommendations/Intent for next treatment session: Next visit will focus on manual therapy, progress therex as tolerable.     Total Treatment Billable Duration:  40 minutes  PT Patient Time In/Time Out  Time In: 0930  Time Out: [address]   Visit # Therapist initials Date Arrived NS/ Cx < 24 hr >24 hr Cx Visit Comments   1  12-28-21 X   15 Visit Max   2 rjk 1-5-22 x      3  01-10-22 X      4  01-12-22 X      5 rjk 1-22-22 x      6 rjk 1-24-22       7  01-27-22 X      8 rjk 1-31-22 x      9  02-03-22 X      10  02-07-22 X   6 approved visits remaining for 2022   11 rjk 2-14-22 x   5/6 approved   12  02-21-22 X      13 BW 02-24-22 X   13/15 visits   14 BW 02-28-22 X   14/15 visits                                         Abbreviations: NS = No Show; CX = cancelled      Future Appointments   Date Time Provider Radha Epperson   3/3/2022  2:30 PM Yolie Ybarra, PT [facility] SFE   3/17/2022 10:30 AM MD MICHELLE Pollack CSAE   5/11/2022  1:00 PM Gissel Bianchi MD [facility]   5/24/2022  8:40 AM Silke Murillo, MD [facility]

## 2022-03-03 ENCOUNTER — HOSPITAL ENCOUNTER (OUTPATIENT)
Dept: PHYSICAL THERAPY | Age: 67
Discharge: HOME | End: 2022-03-03
Attending: ORTHOPAEDIC SURGERY
Payer: OTHER GOVERNMENT

## 2022-03-03 PROCEDURE — 97110 THERAPEUTIC EXERCISES: CPT

## 2022-03-03 NOTE — PROGRESS NOTES
Carol Ann Tamayo  : 1955  Primary: [facility]  Secondary:  Therapy Center at [address] [facility]  [address], [address], [facility] [address]  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 TREATMENT PLAN:  Effective Dates: 2021 TO 3/22/2022 (90 days). Frequency/Duration: 2 times a week for 90 Day(s)    Pre-treatment Symptoms/Complaints: Pt reports minimal pain today, rating 3/10. Pain: Initial:   310 Post Session: 310   Medications Last Reviewed:  3/3/2022  Updated Objective Findings:  None Today  TREATMENT:     THERAPEUTIC EXERCISE: (40 minutes):  Exercises per grid below to improve mobility and strength. Required minimal verbal cues to promote proper body alignment and promote proper body posture. Progressed resistance and repetitions as indicated.      Date:  22 Date:  22 Date:  22 Date:  22 Date:  22   Activity/Exercise        Quad Sets x       Heel Slides x       Supine Lumbar Rotation Stretch x       SKTC DKTC  3 reps  20 sec holds DKTC  3 reps  20 sec holds DKTC  3 reps  20 sec holds DKTC  3 reps  20 sec holds DKTC  3 reps  20 sec holds   Nu-Step Level 4  7 minutes       Piriformis stretch x       LAQ-SAQ x       Hip Abduction Blue T-band  20 reps Blue T-band  20 reps Blue T-band  20 reps Blue T-band  20 reps Black T-band  20 reps   Pelvic tilt x       Standing LE exercises x       Bridging 20 reps 20 reps 20 reps 20 reps 20 reps   Active Hamstring Stretch 30 pumps  3 reps  20 sec holds 3 reps  20 sec holds 3 reps  20 sec holds 3 reps  20 sec holds    x       SLR Flexion        TKE with T-band  Blue T-band  20 reps  R Knee Blue T-band  20 reps  R Knee Black T-band  20 reps Black T-band  20 reps Black T-band  20 reps   Gastroc Slantboard 3 reps  20 sec holds 3 reps  30 sec holds 3 reps  30 sec holds 3 reps  30 sec holds 3 reps  30 sec holds   Isometric Unilateral Hip Flexion 5 reps  10 sec holds 5 reps  10 sec holds 5 reps  10 sec holds 5 reps  10 sec holds 5 reps  10 sec holds   Theraband Rows and Straight Arm Pulldowns Blue T-band  20 reps each Blue T-band  20 reps each Blue T-band  20 reps each Blue T-band  20 reps each Blue T-band  20 reps each   Supine Marching 20 reps  B LE's 20 reps  B LE's 20 reps  B LE's 20 reps  B LE's 20 reps  B LE's   Airdyne  10 minutes 10 minutes 10 minutes 10 minutes       [employer] Portal  Treatment/Session Summary:    · Response to Treatment:  Pt tolerated treatments well today with no c/o increased pain. Pt to continue independent HEP. Discharge from PT.  · Communication/Consultation:  None today  · Equipment provided today:  None today  · Recommendations/Intent for next treatment session: Next visit will focus on manual therapy, progress therex as tolerable.     Total Treatment Billable Duration:  40 minutes  PT Patient Time In/Time Out  Time In: 1430  Time Out: Gonzalez Arvizu, PT   Visit # Therapist initials Date Arrived NS/ Cx < 24 hr >24 hr Cx Visit Comments   1  12-28-21 X   15 Visit Max   2 rjk 1-5-22 x      3  01-10-22 X      4  01-12-22 X      5 rjk 1-22-22 x      6 rjk 1-24-22       7  01-27-22 X      8 rjk 1-31-22 x      9  02-03-22 X      10  02-07-22 X   6 approved visits remaining for 2022   11 rjk 2-14-22 x   5/6 approved   12  02-21-22 X      13  02-24-22 X   13/15 visits   14 BW 02-28-22 X   14/15 visits   15 BW 03-03-22 X   15/15 visits                                Abbreviations: NS = No Show; CX = cancelled      Future Appointments   Date Time Provider Radha Epperson   3/17/2022  8:30 AM LORENAE [facility] ROOM 2 [facility] AYESHA   3/17/2022 10:30 AM MD MICHELLE Murphy CSAE   5/11/2022  1:00 PM Rafa Giang, MD [facility]   5/24/2022  8:40 AM Mark Matute MD [facility]

## 2022-03-17 ENCOUNTER — HOSPITAL ENCOUNTER (OUTPATIENT)
Dept: MAMMOGRAPHY | Age: 67
Discharge: HOME | End: 2022-03-17
Attending: CLINIC/CENTER
Payer: OTHER GOVERNMENT

## 2022-03-17 DIAGNOSIS — Z12.31 ENCOUNTER FOR SCREENING MAMMOGRAM FOR MALIGNANT NEOPLASM OF BREAST: ICD-10-CM

## 2022-03-17 PROCEDURE — 77067 SCR MAMMO BI INCL CAD: CPT

## 2022-03-19 PROBLEM — K59.00 CONSTIPATION: Status: ACTIVE | Noted: 2021-02-01
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
## 2022-03-28 ENCOUNTER — HOSPITAL ENCOUNTER (OUTPATIENT)
Dept: CT IMAGING | Age: 67
Discharge: HOME | End: 2022-03-28
Attending: CLINIC/CENTER

## 2022-03-28 ENCOUNTER — HOSPITAL ENCOUNTER (OUTPATIENT)
Dept: ULTRASOUND IMAGING | Age: 67
Discharge: HOME | End: 2022-03-28
Attending: CLINIC/CENTER

## 2022-03-28 DIAGNOSIS — H53.9 VISUAL DISTURBANCES: ICD-10-CM

## 2022-03-28 DIAGNOSIS — G44.83 PRIMARY COUGH HEADACHE: ICD-10-CM

## 2022-03-28 RX ORDER — SODIUM CHLORIDE 0.9 % (FLUSH) 0.9 %
10 SYRINGE (ML) INJECTION
Status: COMPLETED | OUTPATIENT
Start: 2022-03-28 | End: 2022-03-28

## 2022-03-28 RX ADMIN — Medication 10 ML: at 11:32

## 2022-03-29 NOTE — PROGRESS NOTES
Normal carotid ultrasound, no blockages
impairments found/functional limitations in following categories

## 2022-04-21 VITALS — WEIGHT: 172 LBS | HEIGHT: 67 IN | BODY MASS INDEX: 27 KG/M2

## 2022-04-21 NOTE — PERIOP NOTES
Patient verified name, , and procedure. Type: 1a; abbreviated assessment per anesthesia guidelines    Labs per anesthesia: none    Instructed pt that they will be notified the day before their procedure by the GI Lab for time of arrival if their procedure is [facility] and Pre-op for [place] cases. Arrival times should be called by 5 pm. If no phone is received the patient should contact their respective hospital. The GI lab telephone number is [phone] and ES Pre-op is [phone]. Follow diet and prep instructions per office including NPO status. If patient has NOT received instructions from office patient is advised to call surgeon office, verbalizes understanding. Bath or shower the night before and the am of surgery with non-mositurizing soap. No lotions, oils, powders, cologne on skin. No make up, eye make up or jewelry. Wear loose fitting comfortable, clean clothing. Must have adult present in building the entire time . Medications for the day of procedure Effexor, patient to hold Meloxicam x 5 days and ASA per surgeon's instruction    The following discharge instructions reviewed with patient: medication given during procedure may cause drowsiness for several hours, therefore, do not drive or operate machinery for remainder of the day. You may not drink alcohol on the day of your procedure, please resume regular diet and activity unless otherwise directed. You may experience abdominal distention for several hours that is relieved by the passage of gas. Contact your physician if you have any of the following: fever or chills, severe abdominal pain or excessive amount of bleeding or a large amount when having a bowel movement.  Occasional specks of blood with bowel movement would not be unusual.

## 2022-04-29 ENCOUNTER — HOSPITAL ENCOUNTER (OUTPATIENT)
Dept: SURGERY | Age: 67
Discharge: HOME | End: 2022-04-29

## 2022-05-01 ENCOUNTER — ANESTHESIA EVENT (OUTPATIENT)
Dept: ENDOSCOPY | Age: 67
End: 2022-05-01

## 2022-05-02 ENCOUNTER — HOSPITAL ENCOUNTER (OUTPATIENT)
Age: 67
Setting detail: OUTPATIENT SURGERY
Discharge: HOME | End: 2022-05-02
Attending: SURGERY | Admitting: SURGERY
Payer: OTHER GOVERNMENT

## 2022-05-02 ENCOUNTER — ANESTHESIA (OUTPATIENT)
Dept: ENDOSCOPY | Age: 67
End: 2022-05-02

## 2022-05-02 VITALS
DIASTOLIC BLOOD PRESSURE: 69 MMHG | BODY MASS INDEX: 26.84 KG/M2 | OXYGEN SATURATION: 98 % | TEMPERATURE: 98 F | SYSTOLIC BLOOD PRESSURE: 105 MMHG | RESPIRATION RATE: 16 BRPM | WEIGHT: 171 LBS | HEIGHT: 67 IN | HEART RATE: 69 BPM

## 2022-05-02 DIAGNOSIS — Z12.11 COLON CANCER SCREENING: ICD-10-CM

## 2022-05-02 PROCEDURE — 76060000031 HC ANESTHESIA FIRST 0.5 HR: Performed by: SURGERY

## 2022-05-02 PROCEDURE — 74011250636 HC RX REV CODE- 250/636: Performed by: REGISTERED NURSE

## 2022-05-02 PROCEDURE — 74011000250 HC RX REV CODE- 250: Performed by: REGISTERED NURSE

## 2022-05-02 PROCEDURE — 45378 DIAGNOSTIC COLONOSCOPY: CPT | Performed by: SURGERY

## 2022-05-02 PROCEDURE — 74011250636 HC RX REV CODE- 250/636: Performed by: ANESTHESIOLOGY

## 2022-05-02 PROCEDURE — 76040000025: Performed by: SURGERY

## 2022-05-02 PROCEDURE — 2709999900 HC NON-CHARGEABLE SUPPLY: Performed by: SURGERY

## 2022-05-02 RX ORDER — LIDOCAINE HYDROCHLORIDE 20 MG/ML
INJECTION, SOLUTION EPIDURAL; INFILTRATION; INTRACAUDAL; PERINEURAL AS NEEDED
Status: DISCONTINUED | OUTPATIENT
Start: 2022-05-02 | End: 2022-05-02 | Stop reason: HOSPADM

## 2022-05-02 RX ORDER — SODIUM CHLORIDE, SODIUM LACTATE, POTASSIUM CHLORIDE, CALCIUM CHLORIDE 600; 310; 30; 20 MG/100ML; MG/100ML; MG/100ML; MG/100ML
100 INJECTION, SOLUTION INTRAVENOUS CONTINUOUS
Status: DISCONTINUED | OUTPATIENT
Start: 2022-05-02 | End: 2022-05-02 | Stop reason: HOSPADM

## 2022-05-02 RX ORDER — PROPOFOL 10 MG/ML
INJECTION, EMULSION INTRAVENOUS AS NEEDED
Status: DISCONTINUED | OUTPATIENT
Start: 2022-05-02 | End: 2022-05-02 | Stop reason: HOSPADM

## 2022-05-02 RX ORDER — SODIUM CHLORIDE 0.9 % (FLUSH) 0.9 %
5-40 SYRINGE (ML) INJECTION AS NEEDED
Status: DISCONTINUED | OUTPATIENT
Start: 2022-05-02 | End: 2022-05-02 | Stop reason: HOSPADM

## 2022-05-02 RX ORDER — SODIUM CHLORIDE, SODIUM LACTATE, POTASSIUM CHLORIDE, CALCIUM CHLORIDE 600; 310; 30; 20 MG/100ML; MG/100ML; MG/100ML; MG/100ML
INJECTION, SOLUTION INTRAVENOUS
Status: DISCONTINUED | OUTPATIENT
Start: 2022-05-02 | End: 2022-05-02 | Stop reason: HOSPADM

## 2022-05-02 RX ORDER — GLYCOPYRROLATE 0.2 MG/ML
INJECTION INTRAMUSCULAR; INTRAVENOUS AS NEEDED
Status: DISCONTINUED | OUTPATIENT
Start: 2022-05-02 | End: 2022-05-02 | Stop reason: HOSPADM

## 2022-05-02 RX ORDER — SODIUM CHLORIDE 0.9 % (FLUSH) 0.9 %
5-40 SYRINGE (ML) INJECTION EVERY 8 HOURS
Status: DISCONTINUED | OUTPATIENT
Start: 2022-05-02 | End: 2022-05-02 | Stop reason: HOSPADM

## 2022-05-02 RX ORDER — EPHEDRINE SULFATE/0.9% NACL/PF 50 MG/5 ML
SYRINGE (ML) INTRAVENOUS AS NEEDED
Status: DISCONTINUED | OUTPATIENT
Start: 2022-05-02 | End: 2022-05-02 | Stop reason: HOSPADM

## 2022-05-02 RX ADMIN — PROPOFOL 100 MCG/KG/MIN: 10 INJECTION, EMULSION INTRAVENOUS at 07:51

## 2022-05-02 RX ADMIN — SODIUM CHLORIDE, SODIUM LACTATE, POTASSIUM CHLORIDE, AND CALCIUM CHLORIDE: 600; 310; 30; 20 INJECTION, SOLUTION INTRAVENOUS at 07:46

## 2022-05-02 RX ADMIN — Medication 10 MG: at 07:56

## 2022-05-02 RX ADMIN — LIDOCAINE HYDROCHLORIDE 30 MG: 20 INJECTION, SOLUTION EPIDURAL; INFILTRATION; INTRACAUDAL; PERINEURAL at 07:50

## 2022-05-02 RX ADMIN — Medication 10 MG: at 07:54

## 2022-05-02 RX ADMIN — SODIUM CHLORIDE, SODIUM LACTATE, POTASSIUM CHLORIDE, AND CALCIUM CHLORIDE 100 ML/HR: 600; 310; 30; 20 INJECTION, SOLUTION INTRAVENOUS at 07:45

## 2022-05-02 RX ADMIN — GLYCOPYRROLATE 0.2 MG: 0.2 INJECTION, SOLUTION INTRAMUSCULAR; INTRAVENOUS at 07:54

## 2022-05-02 RX ADMIN — PROPOFOL 100 MG: 10 INJECTION, EMULSION INTRAVENOUS at 07:50
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
## 2022-05-02 NOTE — ANESTHESIA POSTPROCEDURE EVALUATION
Procedure(s):  COLONOSCOPY/ 29.    total IV anesthesia    Anesthesia Post Evaluation        Patient location during evaluation: PACU  Patient participation: complete - patient participated  Level of consciousness: awake and alert  Pain management: adequate  Airway patency: patent  Anesthetic complications: no  Cardiovascular status: acceptable  Respiratory status: acceptable  Hydration status: acceptable  Post anesthesia nausea and vomiting:  none  Final Post Anesthesia Temperature Assessment:  Normothermia (36.0-37.5 degrees C)      INITIAL Post-op Vital signs:   Vitals Value Taken Time   /52 05/02/22 0830   Temp 36.7 °C (98 °F) 05/02/22 0815   Pulse 62 05/02/22 0830   Resp 16 05/02/22 0830   SpO2 99 % 05/02/22 0824

## 2022-05-02 NOTE — ANESTHESIA PREPROCEDURE EVALUATION
Relevant Problems   No relevant active problems       Anesthetic History   No history of anesthetic complications            Review of Systems / Medical History  Patient summary reviewed and pertinent labs reviewed    Pulmonary  Within defined limits                 Neuro/Psych         Psychiatric history    Comments: Occipital neuralgia Cardiovascular                  Exercise tolerance: >4 METS     GI/Hepatic/Renal                Endo/Other             Other Findings   Comments: DVT about 18 years ago, has IVS filter           Physical Exam    Airway  Mallampati: II  TM Distance: 4 - 6 cm  Neck ROM: normal range of motion   Mouth opening: Normal     Cardiovascular    Rhythm: regular  Rate: normal         Dental  No notable dental hx       Pulmonary  Breath sounds clear to auscultation               Abdominal  GI exam deferred       Other Findings            Anesthetic Plan    ASA: 2  Anesthesia type: total IV anesthesia          Induction: Intravenous  Anesthetic plan and risks discussed with: Patient

## 2022-05-02 NOTE — OP NOTES
Colonoscopy Procedure Note    Date of procedure: 2022      Name: Evonne Escobedo      MRN: [id number]       : 1955       Age: 74 y.o. Sex: female    Preoperative Diagnosis: 1. CRC screening          Postoperative Diagnosis: 1. same      2. Diverticulosis - mostly right sided      3. Internal and external hemorrhoids    Procedure(s):  COLONOSCOPY/ 29     Procedure in Detail:  Informed consent was obtained for the procedure. The patient was placed in the left lateral decubitus position and sedation was induced by anesthesia. The [id number] was inserted into the rectum and advanced under direct vision to the cecum, which was identified by the ileocecal valve and appendiceal orifice. The quality of the colonic preparation was good. A careful inspection was made as the colonoscope was withdrawn, including a retroflexed view of the rectum; findings and interventions are described below. Appropriate photodocumentation was obtained. Findings:   Rectum:     - Protruding lesions:     -External Hemorrhoids and     -Internal Hemorrhoids  Sigmoid:   Normal  Descending Colon:   Normal  Transverse Colon:   Normal  Ascending Colon:     - Excavated lesions:     - Diverticulosis  Cecum:     - Excavated lesions:     - Diverticulosis      Specimens: No specimens were collected. * No specimens in log *     Complications: None; patient tolerated the procedure well. EBL - minimal    Recommendations:   - For colon cancer screening in this average-risk patient, colonoscopy may be repeated in 10 years.      Signed By: Taisha Bermeo MD  [address] [facility]  2022 8:09 AM

## 2022-05-02 NOTE — DISCHARGE INSTRUCTIONS
Instructions following colonoscopy:    ACTIVITY:   Resume usual, basic activities around the house today.  You may be light-headed or sleepy from anesthesia, so be careful going up and down stairs.  Avoid driving, operating machinery, making important business decisions, or signing documents for 24 hours.  No alcohol for 24 hours.  If you have not urinated 8 hours after discharge, please call MD.    DIET:   No restriction. Greasy and spicy foods may cause upset after sedation. Please note, some people may have nausea or cramps after this procedure which can result in an upset stomach after eating.  Many people have loose stools or diarrhea immediately after colonoscopy. It is also not uncommon to not have a bowel movement for 2-3 days.  Air may have been put into your belly for the procedure. This may cause abdominal distention and gas pain. It is important to lay on your left side or ambulate to expel gas to relieve discomfort. PAIN:   Some cramping or gas pain is normal after colonoscopy. However, if you experience worsening pain over the course of the day, or pain with associated fever please call the office immediately      [address] IF:   You have a temperature higher than 101.5° Fahrenheit for more than 6 hours.  You have severe nausea or vomiting not relieved by medication; or diarrhea. Continue home medications as previously prescribed. MEDICATION INTERACTION:  During your procedure you potentially received a medication or medications which may reduce the effectiveness of oral contraceptives. Please consider other forms of contraception for 1 month following your procedure if you are currently using oral contraceptives as your primary form of birth control.  In addition to this, we recommend continuing your oral contraceptive as prescribed, unless otherwise instructed by your physician, during this time    After general anesthesia or intravenous sedation, for 24 hours or while taking prescription Narcotics:  · Limit your activities  · A responsible adult needs to be with you for the next 24 hours  · Do not drive and operate hazardous machinery  · Do not make important personal or business decisions  · Do not drink alcoholic beverages  · If you have not urinated within 8 hours after discharge, and you are experiencing discomfort from urinary retention, please go to the nearest ED. · If you have sleep apnea and have a CPAP machine, please use it for all naps and sleeping. · Please use caution when taking narcotics and any of your home medications that may cause drowsiness. *  Please give a list of your current medications to your Primary Care Provider. *  Please update this list whenever your medications are discontinued, doses are      changed, or new medications (including over-the-counter products) are added. *  Please carry medication information at all times in case of emergency situations. These are general instructions for a healthy lifestyle:  No smoking/ No tobacco products/ Avoid exposure to second hand smoke  Surgeon General's Warning:  Quitting smoking now greatly reduces serious risk to your health. Obesity, smoking, and sedentary lifestyle greatly increases your risk for illness  A healthy diet, regular physical exercise & weight monitoring are important for maintaining a healthy lifestyle    You may be retaining fluid if you have a history of heart failure or if you experience any of the following symptoms:  Weight gain of 3 pounds or more overnight or 5 pounds in a week, increased swelling in our hands or feet or shortness of breath while lying flat in bed. Please call your doctor as soon as you notice any of these symptoms; do not wait until your next office visit.

## 2022-06-07 ENCOUNTER — OFFICE VISIT (OUTPATIENT)
Dept: PRIMARY CARE CLINIC | Facility: CLINIC | Age: 67
End: 2022-06-07
Payer: OTHER GOVERNMENT

## 2022-06-07 VITALS
SYSTOLIC BLOOD PRESSURE: 122 MMHG | WEIGHT: 175 LBS | OXYGEN SATURATION: 100 % | HEART RATE: 86 BPM | HEIGHT: 67 IN | BODY MASS INDEX: 27.47 KG/M2 | RESPIRATION RATE: 15 BRPM | TEMPERATURE: 98.5 F | DIASTOLIC BLOOD PRESSURE: 68 MMHG

## 2022-06-07 DIAGNOSIS — G89.29 CHRONIC LOW BACK PAIN WITHOUT SCIATICA, UNSPECIFIED BACK PAIN LATERALITY: ICD-10-CM

## 2022-06-07 DIAGNOSIS — Z63.79 OTHER STRESSFUL LIFE EVENTS AFFECTING FAMILY AND HOUSEHOLD: Primary | ICD-10-CM

## 2022-06-07 DIAGNOSIS — M54.50 CHRONIC LOW BACK PAIN WITHOUT SCIATICA, UNSPECIFIED BACK PAIN LATERALITY: ICD-10-CM

## 2022-06-07 PROCEDURE — 1123F ACP DISCUSS/DSCN MKR DOCD: CPT | Performed by: CLINIC/CENTER

## 2022-06-07 PROCEDURE — 99214 OFFICE O/P EST MOD 30 MIN: CPT | Performed by: CLINIC/CENTER

## 2022-06-07 RX ORDER — CYCLOBENZAPRINE HCL 10 MG
10 TABLET ORAL NIGHTLY PRN
Qty: 30 TABLET | Refills: 1 | Status: SHIPPED | OUTPATIENT
Start: 2022-06-07 | End: 2022-08-06
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
## 2022-06-28 ENCOUNTER — TELEPHONE (OUTPATIENT)
Dept: PRIMARY CARE CLINIC | Facility: CLINIC | Age: 67
End: 2022-06-28

## 2022-06-28 NOTE — TELEPHONE ENCOUNTER
----- Message from Cristina Sonya sent at 6/28/2022  9:58 AM EDT -----  Subject: Referral Request    QUESTIONS   Reason for referral request? Would like to see a vein specialist about her   legs. she said they are in a lot of pain. spider veins. When she travels   on a plane they swell. Has the physician seen you for this condition before? No   Preferred Specialist (if applicable)? Do you already have an appointment scheduled? No  Additional Information for Provider?   ---------------------------------------------------------------------------  --------------  CALL BACK INFO  What is the best way for the office to contact you? OK to leave message on   voicemail  Preferred Call Back Phone Number? [phone]  ---------------------------------------------------------------------------  --------------  SCRIPT ANSWERS  Relationship to Patient?  Self

## 2022-06-29 ENCOUNTER — OFFICE VISIT (OUTPATIENT)
Dept: PRIMARY CARE CLINIC | Facility: CLINIC | Age: 67
End: 2022-06-29
Payer: OTHER GOVERNMENT

## 2022-06-29 VITALS
DIASTOLIC BLOOD PRESSURE: 59 MMHG | SYSTOLIC BLOOD PRESSURE: 110 MMHG | HEIGHT: 67 IN | WEIGHT: 167.7 LBS | TEMPERATURE: 98.4 F | HEART RATE: 69 BPM | BODY MASS INDEX: 26.32 KG/M2 | RESPIRATION RATE: 15 BRPM | OXYGEN SATURATION: 99 %

## 2022-06-29 DIAGNOSIS — M54.50 CHRONIC LOW BACK PAIN WITHOUT SCIATICA, UNSPECIFIED BACK PAIN LATERALITY: ICD-10-CM

## 2022-06-29 DIAGNOSIS — I87.2 VENOUS INSUFFICIENCY OF BOTH LOWER EXTREMITIES: Primary | ICD-10-CM

## 2022-06-29 DIAGNOSIS — G89.29 CHRONIC LOW BACK PAIN WITHOUT SCIATICA, UNSPECIFIED BACK PAIN LATERALITY: ICD-10-CM

## 2022-06-29 PROCEDURE — 99213 OFFICE O/P EST LOW 20 MIN: CPT | Performed by: CLINIC/CENTER

## 2022-06-29 PROCEDURE — 1123F ACP DISCUSS/DSCN MKR DOCD: CPT | Performed by: CLINIC/CENTER

## 2022-06-29 RX ORDER — METHOCARBAMOL 500 MG/1
TABLET, FILM COATED ORAL
Qty: 40 TABLET | Refills: 1 | Status: SHIPPED | OUTPATIENT
Start: 2022-06-29

## 2022-06-29 SDOH — ECONOMIC STABILITY: FOOD INSECURITY: WITHIN THE PAST 12 MONTHS, YOU WORRIED THAT YOUR FOOD WOULD RUN OUT BEFORE YOU GOT MONEY TO BUY MORE.: NEVER TRUE

## 2022-06-29 SDOH — ECONOMIC STABILITY: FOOD INSECURITY: WITHIN THE PAST 12 MONTHS, THE FOOD YOU BOUGHT JUST DIDN'T LAST AND YOU DIDN'T HAVE MONEY TO GET MORE.: NEVER TRUE

## 2022-06-29 ASSESSMENT — PATIENT HEALTH QUESTIONNAIRE - PHQ9
SUM OF ALL RESPONSES TO PHQ QUESTIONS 1-9: 0
SUM OF ALL RESPONSES TO PHQ QUESTIONS 1-9: 0
2. FEELING DOWN, DEPRESSED OR HOPELESS: 0
SUM OF ALL RESPONSES TO PHQ QUESTIONS 1-9: 0
SUM OF ALL RESPONSES TO PHQ QUESTIONS 1-9: 0
SUM OF ALL RESPONSES TO PHQ9 QUESTIONS 1 & 2: 0
1. LITTLE INTEREST OR PLEASURE IN DOING THINGS: 0

## 2022-06-29 ASSESSMENT — SOCIAL DETERMINANTS OF HEALTH (SDOH): HOW HARD IS IT FOR YOU TO PAY FOR THE VERY BASICS LIKE FOOD, HOUSING, MEDICAL CARE, AND HEATING?: NOT HARD AT ALL
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
## 2022-07-07 DIAGNOSIS — I87.2 VENOUS INSUFFICIENCY OF BOTH LOWER EXTREMITIES: Primary | ICD-10-CM

## 2022-08-30 PROBLEM — Z91.199 NO-SHOW FOR APPOINTMENT: Status: ACTIVE | Noted: 2022-08-30

## (undated) DEVICE — SYR 5ML 1/5 GRAD LL NSAF LF --

## (undated) DEVICE — NDL PRT INJ NSAF BLNT 18GX1.5 --

## (undated) DEVICE — SYR 3ML LL TIP 1/10ML GRAD --

## (undated) DEVICE — CANNULA NSL ORAL AD FOR CAPNOFLEX CO2 O2 AIRLFE

## (undated) DEVICE — KENDALL RADIOLUCENT FOAM MONITORING ELECTRODE RECTANGULAR SHAPE: Brand: KENDALL

## (undated) DEVICE — CONNECTOR TBNG OD5-7MM O2 END DISP